# Patient Record
Sex: FEMALE | Race: BLACK OR AFRICAN AMERICAN | NOT HISPANIC OR LATINO | URBAN - METROPOLITAN AREA
[De-identification: names, ages, dates, MRNs, and addresses within clinical notes are randomized per-mention and may not be internally consistent; named-entity substitution may affect disease eponyms.]

---

## 2019-10-09 ENCOUNTER — EMERGENCY (EMERGENCY)
Facility: HOSPITAL | Age: 74
LOS: 1 days | Discharge: HOME | End: 2019-10-09
Attending: EMERGENCY MEDICINE | Admitting: EMERGENCY MEDICINE
Payer: MEDICARE

## 2019-10-09 VITALS
HEART RATE: 97 BPM | TEMPERATURE: 98 F | RESPIRATION RATE: 16 BRPM | OXYGEN SATURATION: 100 % | DIASTOLIC BLOOD PRESSURE: 67 MMHG | SYSTOLIC BLOOD PRESSURE: 146 MMHG

## 2019-10-09 VITALS
RESPIRATION RATE: 16 BRPM | OXYGEN SATURATION: 100 % | HEART RATE: 88 BPM | SYSTOLIC BLOOD PRESSURE: 140 MMHG | DIASTOLIC BLOOD PRESSURE: 68 MMHG

## 2019-10-09 PROCEDURE — 99283 EMERGENCY DEPT VISIT LOW MDM: CPT

## 2019-10-09 PROCEDURE — 99053 MED SERV 10PM-8AM 24 HR FAC: CPT

## 2019-10-09 RX ORDER — FAMOTIDINE 10 MG/ML
20 INJECTION INTRAVENOUS ONCE
Refills: 0 | Status: COMPLETED | OUTPATIENT
Start: 2019-10-09 | End: 2019-10-09

## 2019-10-09 RX ORDER — IPRATROPIUM/ALBUTEROL SULFATE 18-103MCG
3 AEROSOL WITH ADAPTER (GRAM) INHALATION ONCE
Refills: 0 | Status: COMPLETED | OUTPATIENT
Start: 2019-10-09 | End: 2019-10-09

## 2019-10-09 RX ORDER — EPINEPHRINE 0.3 MG/.3ML
0.3 INJECTION INTRAMUSCULAR; SUBCUTANEOUS
Qty: 1 | Refills: 0
Start: 2019-10-09

## 2019-10-09 RX ORDER — ALBUTEROL 90 UG/1
2 AEROSOL, METERED ORAL
Qty: 1 | Refills: 0
Start: 2019-10-09 | End: 2019-11-07

## 2019-10-09 RX ORDER — LORATADINE 10 MG/1
1 TABLET ORAL
Qty: 14 | Refills: 0
Start: 2019-10-09 | End: 2019-10-22

## 2019-10-09 RX ADMIN — Medication 3 MILLILITER(S): at 01:51

## 2019-10-09 RX ADMIN — Medication 60 MILLIGRAM(S): at 01:51

## 2019-10-09 RX ADMIN — FAMOTIDINE 20 MILLIGRAM(S): 10 INJECTION INTRAVENOUS at 01:51

## 2019-10-09 NOTE — ED PROVIDER NOTE - PROGRESS NOTE DETAILS
Pt symptoms improved, no wheezing, lungs clear. Pt speaking full sentences and reports improvement in breathing. Will discharge home, rec to f/u with PMD and allergy MD, Rx for meds given. Return precautions explained and understood.

## 2019-10-09 NOTE — ED ADULT NURSE NOTE - OBJECTIVE STATEMENT
Received pt into spot 25. [id number]. C/o sob/wheezing after eating cake that contained nuts this afternoon and cleaning up/sweeping outside. Resp even and unlabored on arrival to room. o2 saturation 100% on RA. Medicated as ordered. Will continue to closely monitor.

## 2019-10-09 NOTE — ED PROVIDER NOTE - NSFOLLOWUPCLINICS_GEN_ALL_ED_FT
[facility]  Allergy  [address] [place] [address]  Phone: [phone]  Fax:   Follow Up Time: Routine

## 2019-10-09 NOTE — ED PROVIDER NOTE - CLINICAL SUMMARY MEDICAL DECISION MAKING FREE TEXT BOX
73 yo F with history of seasonal allergies, allergies to certain nuts including walnuts, also allergies to dust that presents with allergic reaction which including skin itching but no hives, diff breathing for which she used her albuterol inhaler tonight with minimal relief so family called 911. Appears well without S&S of anaphylaxis. No need for epi. Already took benadryl 50 mg and was given 1 neb by EMS prior to arrival. Wheezing, but otherwise benign exam, no hives and no abd pain. Only 1 system involved. Will provide nebs, steroids, and pepcid, monitor and reassess.

## 2019-10-09 NOTE — ED PROVIDER NOTE - NSFOLLOWUPINSTRUCTIONS_ED_ALL_ED_FT
Please follow up with your primary care doctor and the ALLERGY DOCTOR after you leave the emergency department so that they can follow up and conduct more testing and treatment as they deem necessary. If you have worsening signs or symptoms of what you came in to the Emergency Department today and are not able to see your doctor, go to your nearest emergency department or return to the [facility] emergency department for further care and management.

## 2019-10-09 NOTE — ED ADULT TRIAGE NOTE - CHIEF COMPLAINT QUOTE
Pt comes in for reported allergic reaction to a cake that she had which contained walnuts, and dust from when she was cleaning. Per EMS pt noted to be wheezing upon arrival, duoneb given and pt took 50mg benadryl prior to EMS arrival. Pt in no acute distress, vs as noted and pt reports feeling much better

## 2019-10-09 NOTE — ED PROVIDER NOTE - OBJECTIVE STATEMENT
73 yo F with history of seasonal allergies, allergies to certain nuts including walnuts, also allergies to dust that presents with allergic reaction which including skin itching but no hives, diff breathing for which she used her albuterol inhaler tonight with minimal relief so family called 911. pt was given a neb en route by EMS and toko 50 mg benadryl at home 2 hours prior to arrival. Pt reports this afternoon she ate a pastry which had walnuts and she didn't know, started having slight wheezing, no drooling. She took her albuterol, improved, then she went out to clean the yard and started having more wheezing after, took more albuterol, minimal improvement so called 911. Denies any other allergen exposure such as detergents, clothing, pets. She did not take any other ingestions. denies any recent illnesses, trauma, travel otherwise, Non smoker, no drinking, no drugs. Never had allergy testing.

## 2019-10-09 NOTE — ED PROVIDER NOTE - PATIENT PORTAL LINK FT
You can access the FollowMyHealth Patient Portal offered by [facility] by registering at the following website: [email or web address]. By joining [facility]’s FollowMyHealth portal, you will also be able to view your health information using other applications (apps) compatible with our system.

## 2024-03-04 NOTE — ED PROVIDER NOTE - NSFOLLOWUPCLINICSTOKEN_GEN_ALL_ED_FT
Spoke to patient about upcoming stress test on 3/6/24 @  @ 0900  Patient was reminded hold all caffeine products including \"caffeine free and decaf\" 24 hours prior to exam.     Patient was told to hold the following medications:NONE    Patient was told to continue all other medications as prescribed  Patient was told the location and time of their exam.  Patient was given a phone number to use if they had further questions.   [id number]:Routine;